# Patient Record
Sex: FEMALE | URBAN - METROPOLITAN AREA
[De-identification: names, ages, dates, MRNs, and addresses within clinical notes are randomized per-mention and may not be internally consistent; named-entity substitution may affect disease eponyms.]

---

## 2023-02-21 LAB
ABO, EXTERNAL RESULT: NORMAL
C. TRACHOMATIS, EXTERNAL RESULT: NEGATIVE
HEP B, EXTERNAL RESULT: NEGATIVE
HIV, EXTERNAL RESULT: NORMAL
N. GONORRHOEAE, EXTERNAL RESULT: NEGATIVE
RPR, EXTERNAL RESULT: NORMAL
RUBELLA TITER, EXTERNAL RESULT: NORMAL

## 2023-05-17 ENCOUNTER — HOSPITAL ENCOUNTER (OUTPATIENT)
Facility: HOSPITAL | Age: 28
Discharge: HOME | End: 2023-05-20
Payer: MEDICAID

## 2023-05-17 PROCEDURE — 76805 OB US >/= 14 WKS SNGL FETUS: CPT | Performed by: OBSTETRICS & GYNECOLOGY

## 2023-05-17 NOTE — PROCEDURES
Indication  ========    Fetal anatomic survey    Method  ======    Transabdominal ultrasound examination. View: Good view    Dating  ======    LMP on: 12/24/2022  GA by LMP 20 w + 4 d  AUSTYN by LMP: 9/30/2023  Ultrasound examination on: 5/17/2023  GA by U/S based upon: [facility], BPD, Femur, HC  GA by U/S 20 w + 1 d  AUSTYN by U/S: 10/3/2023  Assigned: based on the LMP, selected on 05/17/2023  Assigned GA 20 w + 4 d  Assigned AUSTYN: 9/30/2023    Fetal Biometry  ============    Standard  BPD 46.1 mm 20w 0d 24% Hadlock  OFD 61.8 mm 21w 1d 72% Anselmo  .9 mm 19w 6d 13% Hadlock  Cerebellum tr 21.5 mm 20w 2d 59% Hill  Nuchal fold 3.2 mm  .5 mm 20w 2d 34% Hadlock  Femur 33.1 mm 20w 2d 34% Hadlock  Humerus 31.1 mm 20w 2d 40% Anselmo   g 20w 1d 29% Hadlock  EFW (lb) 0 lb  EFW (oz) 12 oz  EFW by: Hadlock (BPD-HC-AC-FL)  Extended   6.0 mm  CM 4.3 mm  22% Nicolaides  Nasal bone 5.8 mm  Head / Face / Neck  Nasal bone: present  Other Structures   bpm    General Evaluation  ==============    Cardiac activity present.  bpm. Fetal movements: visualized. Presentation: breech  Placenta: Placental site: anterior  Umbilical cord: Cord vessels: 3 vessel cord. Insertion site: normal insertion  Amniotic fluid: Amount of AF: normal amount.  MVP 5.4 cm    Fetal Anatomy  ===========    Cranium: normal  Lateral ventricles: normal  Choroid plexus: normal  Midline falx: normal  Cavum septi pellucidi: normal  Cerebellum: normal  Cisterna magna: normal  Head / Neck  Neck: normal  Lips: normal  Profile: normal  Nose: normal  Face  Coronal face: normal  Nasal bone: present  Palate: normal  Maxilla: normal  Mandible: normal  Orbits: normal  4-chamber view: normal  RVOT view: normal  LVOT view: normal  3-vessel view: normal  3-vessel-trachea view: normal  Heart / Thorax  Situs: situs solitus (normal)  Aortic arch view: normal  Bicaval view: normal  Ductal arch view: normal  Interventricular septum: normal  Diaphragm: normal  Cord

## 2023-08-07 ENCOUNTER — HOSPITAL ENCOUNTER (OUTPATIENT)
Facility: HOSPITAL | Age: 28
Discharge: HOME | End: 2023-08-10
Payer: MEDICAID

## 2023-08-07 DIAGNOSIS — O24.419 GDM (GESTATIONAL DIABETES MELLITUS): ICD-10-CM

## 2023-08-07 PROCEDURE — 76816 OB US FOLLOW-UP PER FETUS: CPT | Performed by: OBSTETRICS & GYNECOLOGY

## 2023-08-07 PROCEDURE — 76818 FETAL BIOPHYS PROFILE W/NST: CPT | Performed by: OBSTETRICS & GYNECOLOGY

## 2023-08-07 RX ORDER — GLUCOSAMINE HCL/CHONDROITIN SU 500-400 MG
CAPSULE ORAL
Qty: 200 STRIP | Refills: 3 | OUTPATIENT
Start: 2023-08-07

## 2023-08-07 RX ORDER — LANCETS 30 GAUGE
EACH MISCELLANEOUS
Qty: 200 EACH | Refills: 3 | OUTPATIENT
Start: 2023-08-07

## 2023-08-07 RX ORDER — BLOOD-GLUCOSE METER
EACH MISCELLANEOUS
Qty: 1 KIT | Refills: 0 | OUTPATIENT
Start: 2023-08-07

## 2023-08-07 RX ORDER — PEN NEEDLE, DIABETIC 31 GX5/16"
NEEDLE, DISPOSABLE MISCELLANEOUS
Qty: 200 EACH | Refills: 3 | OUTPATIENT
Start: 2023-08-07

## 2023-08-08 NOTE — PROCEDURES
PATIENT: Wanda Torres   -  : 1995   -  DOS:2023   -  INTERPRETING PROVIDER:Hali Stephens,   Indication  ========    Gestational diabetes    Method  ======    Transabdominal ultrasound examination. View: Sufficient    Pregnancy  =========    Persaud pregnancy. Number of fetuses: 1    Dating  ======    LMP on: 2022  GA by LMP 28 w + 2 d  AUSTYN by LMP: 2023  Ultrasound examination on: 2023  GA by U/S based upon: [facility], BPD, Femur, HC  GA by U/S 35 w + 1 d  AUSTYN by U/S: 2023  Assigned: based on the LMP, selected on 2023  Assigned GA 32 w + 2 d  Assigned AUSTYN: 2023    Fetal Biometry  ============    Standard  BPD 82.2 mm 33w 0d 65% Hadlock  .9 mm 35w 2d 96% Anselmo  .9 mm 33w 5d 49% Hadlock  .7 mm 33w 1d 75% Hadlock  Femur 62.5 mm 32w 3d 39% Hadlock  EFW 2,091 g 32w 5d 61% Hadlock  EFW (lb) 4 lb  EFW (oz) 10 oz  EFW by: Hadlock (BPD-HC-AC-FL)  Extended   4.6 mm  Other Structures   bpm    General Evaluation  ==============    Cardiac activity present.  bpm. Fetal movements: visualized. Presentation: cephalic  Umbilical cord: Cord vessels: 3 vessel cord. Insertion site: normal insertion  Amniotic fluid: MVP 4.9 cm. LILIBETH 14.7 cm. Q1 4.9 cm, Q2 3.3 cm, Q3 3.4 cm, Q4 3.1 cm    Fetal Anatomy  ===========    Cranium: normal  Lateral ventricles: normal  Cavum septi pellucidi: normal  4-chamber view: normal  LVOT view: normal  Stomach: normal  Kidneys: normal  Bladder: normal  Fetal sex: XX  Wants to know fetal sex: no    Biophysical Profile  ==============    0: Fetal breathing movements  2: Gross body movements  2: Fetal tone  2: Amniotic fluid volume  NST: reactive  8/10 Biophysical profile score    Non Stress Test  =============    NST interpretation: reactive. Test duration 20 min. Baseline  bpm. Baseline variability: moderate. Accelerations: present. Decelerations: absent. Uterine activity:  absent.  Acoustic stimulation:

## 2023-08-10 ENCOUNTER — NURSE ONLY (OUTPATIENT)
Age: 28
End: 2023-08-10
Payer: MEDICAID

## 2023-08-10 DIAGNOSIS — O24.410 DIET CONTROLLED GESTATIONAL DIABETES MELLITUS (GDM) IN SECOND TRIMESTER: Primary | ICD-10-CM

## 2023-08-10 PROCEDURE — G0108 DIAB MANAGE TRN  PER INDIV: HCPCS

## 2023-08-14 NOTE — PROGRESS NOTES
IS DIABETES? Role of the normal pancreas in energy balance and blood glucose control   The defect seen in diabetes   Signs & symptoms of diabetes   Diagnosis of diabetes   Types of diabetes   Blood glucose targets in non-pregnant & non-pregnant adults       The participant knows  Their type of diabetes: Yes   The basic physiologic defect: Yes  Blood glucose targets: Yes     DATE [facility] TOPIC EVALUATION     8/14/2023 WHAT CAN I EAT? General principles   Determining a healthy weight   Nutritional terms & tools   Healthy Plate method   Carbohydrate Counting   Reading food labels   Free apps   Pregnancy recommendations      The participant   Uses Healthy Plate principles in constructing meals: Yes  Reads food labels in choosing acceptable foods: Yes         DATE [facility] TOPIC EVALUATION     8/14/2023 HOW CAN BLOOD GLUCOSE MONITORING HELP ME? Value of blood glucose monitoring   Realistic expectations   Blood glucose monitoring targets   Target adjustments   Setting a1c & blood glucose targets with provider   Meter selection    Technique for obtaining blood droplet   Blood glucose testing sites   Determining best times to test   Pregnancy recommendations   Data sharing with provider        The participant   Can demonstrate their glucometer procedure: Yes  Logs their BG readings:  Yes         [facility]     8/14/2023 HOW DOES PHYSICAL ACTIVITY AFFECT MY DIABETES? Benefits of physical activity   Beginning a program of physical activity   Walking   Pedometers   Goal setting   Structured physical activity program   Aerobic activity   Resistance   Flexibility   Balance   Physical activity program progression   Safety issues   Barriers to physical activity   Facilitators of physical activity        The participant has established a regular physical activity plan: No    The participant needs to address increasing physical activity.      Royal Otero RN on 8/14/2023 at 2:06 PM    I have personally reviewed the

## 2023-08-21 ENCOUNTER — HOSPITAL ENCOUNTER (OUTPATIENT)
Facility: HOSPITAL | Age: 28
Discharge: HOME | End: 2023-08-24

## 2023-08-21 NOTE — PROCEDURES
PATIENT: Briseida Duron   -  : 1995   -  DOS:2023   -  INTERPRETING PROVIDER:Nikkie Stephens,   Indication  ========    Gestational diabetes    Method  ======    Transabdominal ultrasound examination. View: Sufficient    Pregnancy  =========    Persaud pregnancy. Number of fetuses: 1    Dating  ======    LMP on: 2022  GA by LMP 29 w + 2 d  AUSTYN by LMP: 2023  Assigned: based on the LMP, selected on 2023  Assigned GA 34 w + 2 d  Assigned AUSTYN: 2023    General Evaluation  ==============    Cardiac activity present.  bpm. Fetal movements: visualized. Presentation: cephalic  Placenta: Placental site: anterior  Umbilical cord: Cord vessels: 3 vessel cord    Fetal Biometry  ============    Standard  EFW by: Hadlock (BPD-HC-AC-FL)  Extended   6.3 mm  Other Structures   bpm    Amniotic Fluid Assessment  =====================    Amount of AF: normal amount  MVP 5.4 cm. LILIBETH 14.2 cm. Q1 5.4 cm, Q2 1.9 cm, Q3 4.0 cm, Q4 2.8 cm    Biophysical Profile  ==============    2: Fetal breathing movements  2: Gross body movements  2: Fetal tone  2: Amniotic fluid volume   Biophysical profile score    Findings  =======    Biophysical Profile without NST (26405)    Please see biophysical profile score noted above. Fetal movement and fluid volume appear normal.    Plan of Care  ==========    I reviewed Mariana's blood sugar log. Her blood sugar log for the first week was excellent with all her fasting and postprandial values well controlled. During the second week,  her fasting blood sugars were low 100s. Her postprandial blood sugars were normal. She is eating bread before she goes to bed. We discussed bedtime snacks that are  high in protein to eat instead. Follow-up  ========    Follow up in 2 weeks for fetal growth and to assess blood sugar log.     Coding  ======    Code: O24.410  Description: Gestational diabetes mellitus in pregnancy, diet

## 2023-08-30 ENCOUNTER — TELEPHONE (OUTPATIENT)
Age: 28
End: 2023-08-30

## 2023-08-30 NOTE — TELEPHONE ENCOUNTER
Returned call to pt after speaking to Burton. Informed pt that [facility] staff stated that they would fix the issue with the insurance so that she would be able to  her glucose testing strips enabling her to check her blood sugars 4 times per day. Pt verbalized understanding and states no other questions or concerns at this time.

## 2023-08-30 NOTE — TELEPHONE ENCOUNTER
Telephone call to [facility] for glucose strips. Spoke with pharmacy about pt checking glucose 4 times per day and needing to fill glucose strips. Pharmacy states that they would fix issue with insurance and that pt would be able to get strips later today.

## 2023-08-30 NOTE — TELEPHONE ENCOUNTER
Returned call to patient using  #[id number]. Pt states she is testing her blood glucose 4 times per day and needs more testing strips and the the pharmacy will not give her more because her insurance will not cover them. Pt states she uses [facility] on [address] [phone]. I informed pt that I would call and inquire if order could be placed for generic or other brand that insurance would cove and call and update the patient.

## 2023-09-07 ENCOUNTER — ROUTINE PRENATAL (OUTPATIENT)
Age: 28
End: 2023-09-07

## 2023-09-07 VITALS
TEMPERATURE: 98.3 F | RESPIRATION RATE: 18 BRPM | HEIGHT: 59 IN | HEART RATE: 86 BPM | WEIGHT: 152.4 LBS | BODY MASS INDEX: 30.72 KG/M2 | SYSTOLIC BLOOD PRESSURE: 104 MMHG | DIASTOLIC BLOOD PRESSURE: 67 MMHG

## 2023-09-07 DIAGNOSIS — O24.410 DIET CONTROLLED GESTATIONAL DIABETES MELLITUS (GDM), ANTEPARTUM: ICD-10-CM

## 2023-09-07 DIAGNOSIS — O09.90 SUPERVISION OF HIGH RISK PREGNANCY, ANTEPARTUM: Primary | ICD-10-CM

## 2023-09-07 PROCEDURE — 0502F SUBSEQUENT PRENATAL CARE: CPT | Performed by: FAMILY MEDICINE

## 2023-09-07 RX ORDER — GLUCOSAMINE HCL/CHONDROITIN SU 500-400 MG
CAPSULE ORAL
Qty: 200 STRIP | Refills: 3 | Status: SHIPPED | OUTPATIENT
Start: 2023-09-07

## 2023-09-07 RX ORDER — LANCETS 30 GAUGE
EACH MISCELLANEOUS
Qty: 200 EACH | Refills: 3 | Status: SHIPPED | OUTPATIENT
Start: 2023-09-07

## 2023-09-07 SDOH — ECONOMIC STABILITY: INCOME INSECURITY: HOW HARD IS IT FOR YOU TO PAY FOR THE VERY BASICS LIKE FOOD, HOUSING, MEDICAL CARE, AND HEATING?: PATIENT DECLINED

## 2023-09-07 SDOH — ECONOMIC STABILITY: HOUSING INSECURITY
IN THE LAST 12 MONTHS, WAS THERE A TIME WHEN YOU DID NOT HAVE A STEADY PLACE TO SLEEP OR SLEPT IN A SHELTER (INCLUDING NOW)?: NO

## 2023-09-07 SDOH — ECONOMIC STABILITY: FOOD INSECURITY: WITHIN THE PAST 12 MONTHS, THE FOOD YOU BOUGHT JUST DIDN'T LAST AND YOU DIDN'T HAVE MONEY TO GET MORE.: PATIENT DECLINED

## 2023-09-07 SDOH — ECONOMIC STABILITY: FOOD INSECURITY: WITHIN THE PAST 12 MONTHS, YOU WORRIED THAT YOUR FOOD WOULD RUN OUT BEFORE YOU GOT MONEY TO BUY MORE.: SOMETIMES TRUE

## 2023-09-07 ASSESSMENT — PATIENT HEALTH QUESTIONNAIRE - PHQ9
SUM OF ALL RESPONSES TO PHQ QUESTIONS 1-9: 0
SUM OF ALL RESPONSES TO PHQ9 QUESTIONS 1 & 2: 0
2. FEELING DOWN, DEPRESSED OR HOPELESS: 0
SUM OF ALL RESPONSES TO PHQ QUESTIONS 1-9: 0
1. LITTLE INTEREST OR PLEASURE IN DOING THINGS: 0

## 2023-09-07 NOTE — PROGRESS NOTES
Chief Complaint   Patient presents with    Routine Prenatal Visit     Patient is 36 weeks and 5 days. LMP was 2022. AUSTYN:2023. G2, P1. She is not having vaginal bleeding or discharge. She is taking her prenatal vitamins. She is having fetal movement. She has contractions on and off. She brought glucose logs. No other concerns.       FB-104 (half above goal but last 4 days have been normal with changes to nighttime snacks)  2hr PP:  (only 4 above goal)    26yo  at 36w5d by LMP    IUP: Rh pos  GBS today   Transfer of Care CrossOver: records available in Care Everywhere  A1GDM: reviewed logs, continue QID checks  some elevated fastings but improved recently with dietary changes, f/up next week to review values, discussed possibility of needing insulin  has MFM growth f/up scheduled     Estimated Date of Delivery: 23

## 2023-09-10 LAB — SPECIMEN STATUS REPORT: NORMAL

## 2023-09-11 LAB — B-HEM STREP SPEC QL CULT: NEGATIVE

## 2023-09-13 ENCOUNTER — ROUTINE PRENATAL (OUTPATIENT)
Age: 28
End: 2023-09-13

## 2023-09-13 VITALS
SYSTOLIC BLOOD PRESSURE: 99 MMHG | WEIGHT: 152 LBS | HEART RATE: 89 BPM | RESPIRATION RATE: 18 BRPM | DIASTOLIC BLOOD PRESSURE: 63 MMHG | HEIGHT: 59 IN | TEMPERATURE: 98.2 F | BODY MASS INDEX: 30.64 KG/M2 | OXYGEN SATURATION: 97 %

## 2023-09-13 DIAGNOSIS — Z3A.37 37 WEEKS GESTATION OF PREGNANCY: Primary | ICD-10-CM

## 2023-09-13 PROCEDURE — 0502F SUBSEQUENT PRENATAL CARE: CPT | Performed by: STUDENT IN AN ORGANIZED HEALTH CARE EDUCATION/TRAINING PROGRAM

## 2023-09-14 ENCOUNTER — ROUTINE PRENATAL (OUTPATIENT)
Age: 28
End: 2023-09-14

## 2023-09-14 VITALS — SYSTOLIC BLOOD PRESSURE: 102 MMHG | DIASTOLIC BLOOD PRESSURE: 72 MMHG | OXYGEN SATURATION: 98 % | HEART RATE: 75 BPM

## 2023-09-14 DIAGNOSIS — O24.410 DIET CONTROLLED GESTATIONAL DIABETES MELLITUS (GDM), ANTEPARTUM: Primary | ICD-10-CM

## 2023-09-14 NOTE — PROCEDURES
PATIENT: Jesus Jackson   -  : 1995   -  DOS:2023   -  INTERPRETING PROVIDER:Sanju Farley,   Indication  ========    Gestational diabetes    Method  ======    Transabdominal ultrasound examination. View: Sufficient    Pregnancy  =========    Persaud pregnancy. Number of fetuses: 1    Dating  ======    LMP on: 2022  GA by LMP 40 w + 5 d  AUSTYN by LMP: 2023  Ultrasound examination on: 2023  GA by U/S based upon: [facility], BPD, Femur, HC  GA by U/S 39 w + 4 d  AUSTYN by U/S: 10/8/2023  Assigned: based on the LMP, selected on 2023  Assigned GA 37 w + 5 d  Assigned AUSTYN: 2023    Fetal Biometry  ============    Standard  BPD 89.7 mm 36w 2d 32% Hadlock  .8 mm 38w 3d 60% Anselmo  .0 mm 36w 4d 8% Hadlock  .5 mm 37w 5d 68% Hadlock  Femur 69.5 mm 35w 5d 9% Hadlock  EFW 3,076 g 37w 2d 41% Hadlock  EFW (lb) 6 lb  EFW (oz) 13 oz  EFW by: Hadlock (BPD-HC-AC-FL)  Extended   5.3 mm  Other Structures   bpm    General Evaluation  ==============    Cardiac activity present.  bpm. Fetal movements: visualized. Presentation: cephalic  Placenta: Placental site: anterior  Umbilical cord: Cord vessels: 3 vessel cord  Amniotic fluid: Amount of AF: normal amount. MVP 4.5 cm. LILIBETH 15.5 cm. Q1 3.9 cm, Q2 4.4 cm, Q3 4.5 cm, Q4 2.7 cm    Fetal Anatomy  ===========    Cranium: normal  Lateral ventricles: normal  Cavum septi pellucidi: normal  4-chamber view: normal  LVOT view: normal  3-vessel view: normal  Stomach: normal  Kidneys: normal  Bladder: normal  Fetal sex: XX  Wants to know fetal sex: no    Biophysical Profile  ==============    2: Fetal breathing movements  2: Gross body movements  2: Fetal tone  2: Amniotic fluid volume   Biophysical profile score    Findings  =======    Biophysical Profile without NST (63651)    Please see biophysical profile score noted above.  Fetal movement and fluid volume appear normal.    Plan of Care  ==========    I reviewed

## 2023-09-20 ENCOUNTER — ROUTINE PRENATAL (OUTPATIENT)
Age: 28
End: 2023-09-20
Payer: MEDICAID

## 2023-09-20 VITALS
OXYGEN SATURATION: 99 % | HEIGHT: 59 IN | BODY MASS INDEX: 30.64 KG/M2 | RESPIRATION RATE: 18 BRPM | DIASTOLIC BLOOD PRESSURE: 62 MMHG | TEMPERATURE: 97.7 F | HEART RATE: 75 BPM | SYSTOLIC BLOOD PRESSURE: 98 MMHG | WEIGHT: 152 LBS

## 2023-09-20 DIAGNOSIS — Z3A.38 38 WEEKS GESTATION OF PREGNANCY: Primary | ICD-10-CM

## 2023-09-20 DIAGNOSIS — O24.410 DIET CONTROLLED GESTATIONAL DIABETES MELLITUS (GDM), ANTEPARTUM: ICD-10-CM

## 2023-09-20 PROCEDURE — 59025 FETAL NON-STRESS TEST: CPT

## 2023-09-20 RX ORDER — GLUCOSAMINE HCL/CHONDROITIN SU 500-400 MG
CAPSULE ORAL
Qty: 200 STRIP | Refills: 3 | Status: SHIPPED | OUTPATIENT
Start: 2023-09-20

## 2023-09-20 RX ORDER — LANCETS 30 GAUGE
EACH MISCELLANEOUS
Qty: 200 EACH | Refills: 3 | Status: SHIPPED | OUTPATIENT
Start: 2023-09-20

## 2023-09-20 RX ORDER — PEN NEEDLE, DIABETIC 31 GX5/16"
NEEDLE, DISPOSABLE MISCELLANEOUS
Qty: 200 EACH | Refills: 3 | Status: SHIPPED | OUTPATIENT
Start: 2023-09-20

## 2023-09-25 ENCOUNTER — HOSPITAL ENCOUNTER (INPATIENT)
Facility: HOSPITAL | Age: 28
LOS: 3 days | Discharge: HOME | DRG: 560 | End: 2023-09-28
Attending: FAMILY MEDICINE | Admitting: FAMILY MEDICINE
Payer: MEDICAID

## 2023-09-25 PROBLEM — Z34.90 ENCOUNTER FOR PLANNED INDUCTION OF LABOR: Status: ACTIVE | Noted: 2023-09-25

## 2023-09-25 LAB
ALBUMIN SERPL-MCNC: 2.8 G/DL (ref 3.5–5)
ALBUMIN/GLOB SERPL: 0.6 (ref 1.1–2.2)
ALP SERPL-CCNC: 150 U/L (ref 45–117)
ALT SERPL-CCNC: 24 U/L (ref 12–78)
ANION GAP SERPL CALC-SCNC: 9 MMOL/L (ref 5–15)
AST SERPL-CCNC: 16 U/L (ref 15–37)
BASOPHILS # BLD: 0 K/UL (ref 0–0.1)
BASOPHILS NFR BLD: 0 % (ref 0–1)
BILIRUB SERPL-MCNC: 0.2 MG/DL (ref 0.2–1)
BUN SERPL-MCNC: 6 MG/DL (ref 6–20)
BUN/CREAT SERPL: 10 (ref 12–20)
CALCIUM SERPL-MCNC: 8.7 MG/DL (ref 8.5–10.1)
CHLORIDE SERPL-SCNC: 106 MMOL/L (ref 97–108)
CO2 SERPL-SCNC: 21 MMOL/L (ref 21–32)
CREAT SERPL-MCNC: 0.58 MG/DL (ref 0.55–1.02)
DIFFERENTIAL METHOD BLD: ABNORMAL
EOSINOPHIL # BLD: 0 K/UL (ref 0–0.4)
EOSINOPHIL NFR BLD: 0 % (ref 0–7)
ERYTHROCYTE [DISTWIDTH] IN BLOOD BY AUTOMATED COUNT: 14 % (ref 11.5–14.5)
GLOBULIN SER CALC-MCNC: 4.4 G/DL (ref 2–4)
GLUCOSE BLD STRIP.AUTO-MCNC: 94 MG/DL (ref 65–117)
GLUCOSE SERPL-MCNC: 80 MG/DL (ref 65–100)
HCT VFR BLD AUTO: 37.5 % (ref 35–47)
HGB BLD-MCNC: 12.3 G/DL (ref 11.5–16)
IMM GRANULOCYTES # BLD AUTO: 0.1 K/UL (ref 0–0.04)
IMM GRANULOCYTES NFR BLD AUTO: 1 % (ref 0–0.5)
LYMPHOCYTES # BLD: 3.2 K/UL (ref 0.8–3.5)
LYMPHOCYTES NFR BLD: 26 % (ref 12–49)
MCH RBC QN AUTO: 26.7 PG (ref 26–34)
MCHC RBC AUTO-ENTMCNC: 32.8 G/DL (ref 30–36.5)
MCV RBC AUTO: 81.5 FL (ref 80–99)
MONOCYTES # BLD: 0.8 K/UL (ref 0–1)
MONOCYTES NFR BLD: 6 % (ref 5–13)
NEUTS SEG # BLD: 8.2 K/UL (ref 1.8–8)
NEUTS SEG NFR BLD: 67 % (ref 32–75)
NRBC # BLD: 0 K/UL (ref 0–0.01)
NRBC BLD-RTO: 0 PER 100 WBC
PLATELET # BLD AUTO: 371 K/UL (ref 150–400)
PMV BLD AUTO: 9.9 FL (ref 8.9–12.9)
POTASSIUM SERPL-SCNC: 3.8 MMOL/L (ref 3.5–5.1)
PROT SERPL-MCNC: 7.2 G/DL (ref 6.4–8.2)
RBC # BLD AUTO: 4.6 M/UL (ref 3.8–5.2)
SERVICE CMNT-IMP: NORMAL
SODIUM SERPL-SCNC: 136 MMOL/L (ref 136–145)
WBC # BLD AUTO: 12.3 K/UL (ref 3.6–11)

## 2023-09-25 PROCEDURE — 6370000000 HC RX 637 (ALT 250 FOR IP)

## 2023-09-25 PROCEDURE — 85025 COMPLETE CBC W/AUTO DIFF WBC: CPT

## 2023-09-25 PROCEDURE — 86900 BLOOD TYPING SEROLOGIC ABO: CPT

## 2023-09-25 PROCEDURE — 36415 COLL VENOUS BLD VENIPUNCTURE: CPT

## 2023-09-25 PROCEDURE — 1100000000 HC RM PRIVATE

## 2023-09-25 PROCEDURE — 82962 GLUCOSE BLOOD TEST: CPT

## 2023-09-25 PROCEDURE — 59200 INSERT CERVICAL DILATOR: CPT | Performed by: FAMILY MEDICINE

## 2023-09-25 PROCEDURE — 80053 COMPREHEN METABOLIC PANEL: CPT

## 2023-09-25 PROCEDURE — 7210000100 HC LABOR FEE PER 1 HR: Performed by: FAMILY MEDICINE

## 2023-09-25 PROCEDURE — 2580000003 HC RX 258

## 2023-09-25 PROCEDURE — 86850 RBC ANTIBODY SCREEN: CPT

## 2023-09-25 PROCEDURE — 86901 BLOOD TYPING SEROLOGIC RH(D): CPT

## 2023-09-25 RX ORDER — SODIUM CHLORIDE 9 MG/ML
INJECTION, SOLUTION INTRAVENOUS PRN
Status: CANCELLED | OUTPATIENT
Start: 2023-09-25

## 2023-09-25 RX ORDER — SODIUM CHLORIDE, SODIUM LACTATE, POTASSIUM CHLORIDE, CALCIUM CHLORIDE 600; 310; 30; 20 MG/100ML; MG/100ML; MG/100ML; MG/100ML
INJECTION, SOLUTION INTRAVENOUS CONTINUOUS
Status: DISCONTINUED | OUTPATIENT
Start: 2023-09-25 | End: 2023-09-28 | Stop reason: HOSPADM

## 2023-09-25 RX ORDER — IBUPROFEN 800 MG/1
800 TABLET ORAL EVERY 8 HOURS SCHEDULED
Status: DISCONTINUED | OUTPATIENT
Start: 2023-09-25 | End: 2023-09-25

## 2023-09-25 RX ORDER — SODIUM CHLORIDE 0.9 % (FLUSH) 0.9 %
5-40 SYRINGE (ML) INJECTION EVERY 12 HOURS SCHEDULED
Status: DISCONTINUED | OUTPATIENT
Start: 2023-09-25 | End: 2023-09-28 | Stop reason: HOSPADM

## 2023-09-25 RX ORDER — SODIUM CHLORIDE, SODIUM LACTATE, POTASSIUM CHLORIDE, AND CALCIUM CHLORIDE .6; .31; .03; .02 G/100ML; G/100ML; G/100ML; G/100ML
500 INJECTION, SOLUTION INTRAVENOUS PRN
Status: DISCONTINUED | OUTPATIENT
Start: 2023-09-25 | End: 2023-09-28 | Stop reason: HOSPADM

## 2023-09-25 RX ORDER — SODIUM CHLORIDE 0.9 % (FLUSH) 0.9 %
5-40 SYRINGE (ML) INJECTION PRN
Status: DISCONTINUED | OUTPATIENT
Start: 2023-09-25 | End: 2023-09-26 | Stop reason: SDUPTHER

## 2023-09-25 RX ORDER — SODIUM CHLORIDE, SODIUM LACTATE, POTASSIUM CHLORIDE, AND CALCIUM CHLORIDE .6; .31; .03; .02 G/100ML; G/100ML; G/100ML; G/100ML
1000 INJECTION, SOLUTION INTRAVENOUS PRN
Status: DISCONTINUED | OUTPATIENT
Start: 2023-09-25 | End: 2023-09-28 | Stop reason: HOSPADM

## 2023-09-25 RX ORDER — SODIUM CHLORIDE 0.9 % (FLUSH) 0.9 %
5-40 SYRINGE (ML) INJECTION EVERY 12 HOURS SCHEDULED
Status: CANCELLED | OUTPATIENT
Start: 2023-09-25

## 2023-09-25 RX ORDER — SODIUM CHLORIDE 9 MG/ML
25 INJECTION, SOLUTION INTRAVENOUS PRN
Status: DISCONTINUED | OUTPATIENT
Start: 2023-09-25 | End: 2023-09-28 | Stop reason: HOSPADM

## 2023-09-25 RX ORDER — DOCUSATE SODIUM 100 MG/1
100 CAPSULE, LIQUID FILLED ORAL 2 TIMES DAILY
Status: DISCONTINUED | OUTPATIENT
Start: 2023-09-25 | End: 2023-09-26 | Stop reason: HOSPADM

## 2023-09-25 RX ORDER — DOCUSATE SODIUM 100 MG/1
100 CAPSULE, LIQUID FILLED ORAL 2 TIMES DAILY
Status: CANCELLED | OUTPATIENT
Start: 2023-09-25

## 2023-09-25 RX ORDER — SODIUM CHLORIDE 0.9 % (FLUSH) 0.9 %
5-40 SYRINGE (ML) INJECTION PRN
Status: CANCELLED | OUTPATIENT
Start: 2023-09-25

## 2023-09-25 RX ORDER — ONDANSETRON 2 MG/ML
4 INJECTION INTRAMUSCULAR; INTRAVENOUS EVERY 6 HOURS PRN
Status: DISCONTINUED | OUTPATIENT
Start: 2023-09-25 | End: 2023-09-28 | Stop reason: HOSPADM

## 2023-09-25 RX ORDER — SODIUM CHLORIDE, SODIUM LACTATE, POTASSIUM CHLORIDE, CALCIUM CHLORIDE 600; 310; 30; 20 MG/100ML; MG/100ML; MG/100ML; MG/100ML
INJECTION, SOLUTION INTRAVENOUS CONTINUOUS
Status: CANCELLED | OUTPATIENT
Start: 2023-09-25

## 2023-09-25 RX ORDER — LANOLIN/MINERAL OIL
LOTION (ML) TOPICAL PRN
Status: CANCELLED | OUTPATIENT
Start: 2023-09-25

## 2023-09-25 RX ADMIN — Medication 25 MCG: at 20:10

## 2023-09-25 RX ADMIN — Medication 25 MCG: at 22:08

## 2023-09-25 NOTE — H&P
[facility], [address] [place] [address]   Office [phone], Fax [phone]      History & Physical    Name: Julio César Bridges MRN: [id number]  SSN: [id number]    YOB: 1995  Age: 33 y.o. Sex: female      Subjective:     Reason for Admission:  Pregnancy and A1GDM    History of Present Illness: Ms. Clara Izaguirre is a 33 y.o.  female with an estimated gestational age of 45w4d with Estimated Date of Delivery: 23. Patient has no complaints . Here for IOL for A1GDM. Pregnancy has been complicated by V6TSP. Patient denies headaches, blurring of vision, bleeding PV, LOF, contractions, foul smelling discharge. OB History    Para Term  AB Living   2 1 1     1   SAB IAB Ectopic Molar Multiple Live Births             1      # Outcome Date GA Lbr Cortez/2nd Weight Sex Delivery Anes PTL Lv   2 Current            1 Term    8 lb (3.629 kg) M Vag-Spont   FRED     Past Medical History:   Diagnosis Date    Diabetes mellitus ([address])      No past surgical history on file. Social History     Occupational History    Not on file   Tobacco Use    Smoking status: Never    Smokeless tobacco: Never   Vaping Use    Vaping Use: Never used   Substance and Sexual Activity    Alcohol use: Not Currently    Drug use: Never    Sexual activity: Not on file      No family history on file. No Known Allergies  Prior to Admission medications    Medication Sig Start Date End Date Taking? Authorizing Provider   Alcohol Swabs (ALCOHOL PREP) PADS Please use to four times a day to clean finger before checking blood sugar. Substitute for insurance preferred brand 23   Sven Morillo MD   blood glucose monitor strips Use to check blood glucose 4 times a day. Please substitute for insurance preferred brand 23   vSen Morillo MD   Lancets MISC Use to check blood glucose 4 times a day.  Please substitute for insurance preferred brand 23   Sven Morillo MD   Prenatal Vit-Fe

## 2023-09-25 NOTE — PROGRESS NOTES
1900: SBAR report given to this RN per Maria Guadalupe Pacheco.     6616: SBAR report given BRENDAN Hogue RN per this RN.

## 2023-09-26 ENCOUNTER — ANESTHESIA EVENT (OUTPATIENT)
Facility: HOSPITAL | Age: 28
DRG: 560 | End: 2023-09-26
Payer: MEDICAID

## 2023-09-26 ENCOUNTER — ANESTHESIA (OUTPATIENT)
Facility: HOSPITAL | Age: 28
DRG: 560 | End: 2023-09-26
Payer: MEDICAID

## 2023-09-26 LAB
ABO + RH BLD: NORMAL
BLOOD GROUP ANTIBODIES SERPL: NORMAL
GLUCOSE BLD STRIP.AUTO-MCNC: 107 MG/DL (ref 65–117)
GLUCOSE BLD STRIP.AUTO-MCNC: 85 MG/DL (ref 65–117)
SERVICE CMNT-IMP: NORMAL
SERVICE CMNT-IMP: NORMAL
SPECIMEN EXP DATE BLD: NORMAL

## 2023-09-26 PROCEDURE — 82962 GLUCOSE BLOOD TEST: CPT

## 2023-09-26 PROCEDURE — 3700000025 EPIDURAL BLOCK: Performed by: ANESTHESIOLOGY

## 2023-09-26 PROCEDURE — 7220000101 HC DELIVERY VAGINAL/SINGLE: Performed by: FAMILY MEDICINE

## 2023-09-26 PROCEDURE — 4A1HXCZ MONITORING OF PRODUCTS OF CONCEPTION, CARDIAC RATE, EXTERNAL APPROACH: ICD-10-PCS | Performed by: FAMILY MEDICINE

## 2023-09-26 PROCEDURE — 59400 OBSTETRICAL CARE: CPT | Performed by: FAMILY MEDICINE

## 2023-09-26 PROCEDURE — 1120000000 HC RM PRIVATE OB

## 2023-09-26 PROCEDURE — 2580000003 HC RX 258

## 2023-09-26 PROCEDURE — 51701 INSERT BLADDER CATHETER: CPT

## 2023-09-26 PROCEDURE — 7210000100 HC LABOR FEE PER 1 HR: Performed by: FAMILY MEDICINE

## 2023-09-26 PROCEDURE — 94761 N-INVAS EAR/PLS OXIMETRY MLT: CPT

## 2023-09-26 PROCEDURE — 76815 OB US LIMITED FETUS(S): CPT | Performed by: FAMILY MEDICINE

## 2023-09-26 PROCEDURE — 6370000000 HC RX 637 (ALT 250 FOR IP): Performed by: FAMILY MEDICINE

## 2023-09-26 PROCEDURE — 36415 COLL VENOUS BLD VENIPUNCTURE: CPT

## 2023-09-26 PROCEDURE — 3E0DXGC INTRODUCTION OF OTHER THERAPEUTIC SUBSTANCE INTO MOUTH AND PHARYNX, EXTERNAL APPROACH: ICD-10-PCS | Performed by: FAMILY MEDICINE

## 2023-09-26 PROCEDURE — 00HU33Z INSERTION OF INFUSION DEVICE INTO SPINAL CANAL, PERCUTANEOUS APPROACH: ICD-10-PCS | Performed by: STUDENT IN AN ORGANIZED HEALTH CARE EDUCATION/TRAINING PROGRAM

## 2023-09-26 PROCEDURE — 2500000003 HC RX 250 WO HCPCS: Performed by: NURSE ANESTHETIST, CERTIFIED REGISTERED

## 2023-09-26 PROCEDURE — 3700000156 HC EPIDURAL ANESTHESIA: Performed by: NURSE ANESTHETIST, CERTIFIED REGISTERED

## 2023-09-26 PROCEDURE — 6370000000 HC RX 637 (ALT 250 FOR IP)

## 2023-09-26 PROCEDURE — 6360000002 HC RX W HCPCS: Performed by: NURSE ANESTHETIST, CERTIFIED REGISTERED

## 2023-09-26 RX ORDER — SODIUM CHLORIDE, SODIUM LACTATE, POTASSIUM CHLORIDE, CALCIUM CHLORIDE 600; 310; 30; 20 MG/100ML; MG/100ML; MG/100ML; MG/100ML
INJECTION, SOLUTION INTRAVENOUS CONTINUOUS
Status: DISCONTINUED | OUTPATIENT
Start: 2023-09-26 | End: 2023-09-26 | Stop reason: SDUPTHER

## 2023-09-26 RX ORDER — SODIUM CHLORIDE 0.9 % (FLUSH) 0.9 %
5-40 SYRINGE (ML) INJECTION EVERY 12 HOURS SCHEDULED
Status: DISCONTINUED | OUTPATIENT
Start: 2023-09-26 | End: 2023-09-28 | Stop reason: HOSPADM

## 2023-09-26 RX ORDER — SODIUM CHLORIDE 9 MG/ML
INJECTION, SOLUTION INTRAVENOUS PRN
Status: DISCONTINUED | OUTPATIENT
Start: 2023-09-26 | End: 2023-09-28 | Stop reason: HOSPADM

## 2023-09-26 RX ORDER — DOCUSATE SODIUM 100 MG/1
100 CAPSULE, LIQUID FILLED ORAL 2 TIMES DAILY
Status: DISCONTINUED | OUTPATIENT
Start: 2023-09-26 | End: 2023-09-28 | Stop reason: HOSPADM

## 2023-09-26 RX ORDER — LANOLIN/MINERAL OIL
LOTION (ML) TOPICAL PRN
Status: DISCONTINUED | OUTPATIENT
Start: 2023-09-26 | End: 2023-09-28 | Stop reason: HOSPADM

## 2023-09-26 RX ORDER — SODIUM CHLORIDE, SODIUM LACTATE, POTASSIUM CHLORIDE, CALCIUM CHLORIDE 600; 310; 30; 20 MG/100ML; MG/100ML; MG/100ML; MG/100ML
INJECTION, SOLUTION INTRAVENOUS CONTINUOUS
Status: DISCONTINUED | OUTPATIENT
Start: 2023-09-26 | End: 2023-09-28 | Stop reason: HOSPADM

## 2023-09-26 RX ORDER — IBUPROFEN 600 MG/1
600 TABLET ORAL EVERY 8 HOURS SCHEDULED
Status: DISCONTINUED | OUTPATIENT
Start: 2023-09-26 | End: 2023-09-28 | Stop reason: HOSPADM

## 2023-09-26 RX ORDER — ACETAMINOPHEN 325 MG/1
650 TABLET ORAL EVERY 6 HOURS PRN
Status: DISCONTINUED | OUTPATIENT
Start: 2023-09-26 | End: 2023-09-28 | Stop reason: HOSPADM

## 2023-09-26 RX ORDER — BUPIVACAINE HYDROCHLORIDE 2.5 MG/ML
INJECTION, SOLUTION EPIDURAL; INFILTRATION; INTRACAUDAL PRN
Status: DISCONTINUED | OUTPATIENT
Start: 2023-09-26 | End: 2023-09-26 | Stop reason: SDUPTHER

## 2023-09-26 RX ORDER — DOCUSATE SODIUM 100 MG/1
100 CAPSULE, LIQUID FILLED ORAL 2 TIMES DAILY
Status: DISCONTINUED | OUTPATIENT
Start: 2023-09-26 | End: 2023-09-26 | Stop reason: SDUPTHER

## 2023-09-26 RX ORDER — SODIUM CHLORIDE 0.9 % (FLUSH) 0.9 %
5-40 SYRINGE (ML) INJECTION PRN
Status: DISCONTINUED | OUTPATIENT
Start: 2023-09-26 | End: 2023-09-28 | Stop reason: HOSPADM

## 2023-09-26 RX ORDER — NALOXONE HYDROCHLORIDE 0.4 MG/ML
INJECTION, SOLUTION INTRAMUSCULAR; INTRAVENOUS; SUBCUTANEOUS PRN
Status: DISCONTINUED | OUTPATIENT
Start: 2023-09-26 | End: 2023-09-28 | Stop reason: HOSPADM

## 2023-09-26 RX ORDER — LIDOCAINE HYDROCHLORIDE AND EPINEPHRINE 15; 5 MG/ML; UG/ML
INJECTION, SOLUTION EPIDURAL PRN
Status: DISCONTINUED | OUTPATIENT
Start: 2023-09-26 | End: 2023-09-26 | Stop reason: SDUPTHER

## 2023-09-26 RX ORDER — LANOLIN/MINERAL OIL
LOTION (ML) TOPICAL PRN
Status: DISCONTINUED | OUTPATIENT
Start: 2023-09-26 | End: 2023-09-26 | Stop reason: SDUPTHER

## 2023-09-26 RX ORDER — POLYETHYLENE GLYCOL 3350 17 G/17G
17 POWDER, FOR SOLUTION ORAL DAILY
Status: DISCONTINUED | OUTPATIENT
Start: 2023-09-26 | End: 2023-09-28 | Stop reason: HOSPADM

## 2023-09-26 RX ORDER — SODIUM CHLORIDE 0.9 % (FLUSH) 0.9 %
5-40 SYRINGE (ML) INJECTION PRN
Status: DISCONTINUED | OUTPATIENT
Start: 2023-09-26 | End: 2023-09-26 | Stop reason: SDUPTHER

## 2023-09-26 RX ORDER — FENTANYL 0.2 MG/100ML-BUPIV 0.125%-NACL 0.9% EPIDURAL INJ 2/0.125 MCG/ML-%
10 SOLUTION INJECTION CONTINUOUS
Status: DISCONTINUED | OUTPATIENT
Start: 2023-09-26 | End: 2023-09-28 | Stop reason: HOSPADM

## 2023-09-26 RX ORDER — EPHEDRINE SULFATE/0.9% NACL/PF 50 MG/5 ML
10 SYRINGE (ML) INTRAVENOUS EVERY 5 MIN PRN
Status: DISPENSED | OUTPATIENT
Start: 2023-09-26 | End: 2023-09-28

## 2023-09-26 RX ADMIN — SODIUM CHLORIDE, POTASSIUM CHLORIDE, SODIUM LACTATE AND CALCIUM CHLORIDE 1000 ML: 600; 310; 30; 20 INJECTION, SOLUTION INTRAVENOUS at 08:32

## 2023-09-26 RX ADMIN — Medication 25 MCG: at 02:58

## 2023-09-26 RX ADMIN — IBUPROFEN 600 MG: 600 TABLET ORAL at 23:05

## 2023-09-26 RX ADMIN — Medication 10 MG: at 10:00

## 2023-09-26 RX ADMIN — LIDOCAINE HYDROCHLORIDE AND EPINEPHRINE 3 ML: 15; 5 INJECTION, SOLUTION EPIDURAL at 09:28

## 2023-09-26 RX ADMIN — IBUPROFEN 600 MG: 600 TABLET ORAL at 15:40

## 2023-09-26 RX ADMIN — Medication 10 ML/HR: at 09:46

## 2023-09-26 RX ADMIN — SODIUM CHLORIDE, POTASSIUM CHLORIDE, SODIUM LACTATE AND CALCIUM CHLORIDE: 600; 310; 30; 20 INJECTION, SOLUTION INTRAVENOUS at 10:00

## 2023-09-26 RX ADMIN — Medication 25 MCG: at 00:44

## 2023-09-26 RX ADMIN — BUPIVACAINE HYDROCHLORIDE 5 ML: 2.5 INJECTION, SOLUTION EPIDURAL; INFILTRATION; INTRACAUDAL; PERINEURAL at 09:30

## 2023-09-26 RX ADMIN — LIDOCAINE HYDROCHLORIDE AND EPINEPHRINE 2 ML: 15; 5 INJECTION, SOLUTION EPIDURAL at 09:30

## 2023-09-26 RX ADMIN — Medication 25 MCG: at 05:17

## 2023-09-26 ASSESSMENT — PAIN SCALES - GENERAL: PAINLEVEL_OUTOF10: 5

## 2023-09-26 ASSESSMENT — PAIN DESCRIPTION - DESCRIPTORS: DESCRIPTORS: SORE;ACHING;CRAMPING

## 2023-09-26 ASSESSMENT — PAIN DESCRIPTION - LOCATION: LOCATION: ABDOMEN;PERINEUM

## 2023-09-26 ASSESSMENT — PAIN DESCRIPTION - ORIENTATION: ORIENTATION: LOWER

## 2023-09-26 NOTE — PROGRESS NOTES
Labor Progress Note  Patient seen, fetal heart rate and contraction pattern evaluated, patient examined. Patient Vitals for the past 4 hrs:   Pulse BP SpO2   09/26/23 1023 -- -- 100 %   09/26/23 1018 -- -- 100 %   09/26/23 1013 -- -- 100 %   09/26/23 1010 85 (!) 111/54 --   09/26/23 1008 91 -- 100 %   09/26/23 1007 78 119/60 --   09/26/23 1003 70 117/62 100 %   09/26/23 1001 75 (!) 101/59 --   09/26/23 0958 -- (!) 99/51 --   09/26/23 0955 -- (!) 84/49 --   09/26/23 0952 -- (!) 101/55 --   09/26/23 0949 -- (!) 92/53 --   09/26/23 0946 -- (!) 81/47 --   09/26/23 0943 -- (!) 90/50 --   09/26/23 0940 -- (!) 105/53 --   09/26/23 0937 -- (!) 100/55 --   09/26/23 0932 -- 120/67 --            Physical Exam:  Cervical Exam:     Membranes:  Intact  Uterine Activity: Frequency: Every 4-5 minutes  Fetal Heart Rate: Baseline: 140 per minute  Decelerations: late, noted, positional changes done  Uterine contractions: regular, every 3-4 minutes  SVE @ 10/100/0      Assessment/Plan     Kenny Vo is a 33 y.o. female currently in active labor    1. IOL- GBS neg. S/p cytotec and cook cath. - Membranes ruptured  - Continue FHR monitoring and toco  - Patient will push now       A1GDM: diet-controlled. Failed 3-hr GTT. Antepartum glucose within range.   - Glucose q2h    Pt discussed with Dr. Mayank Alfonso ( attending)     Cory Russell MD  [facility] Practice Resident

## 2023-09-26 NOTE — ANESTHESIA POSTPROCEDURE EVALUATION
Department of Anesthesiology  Postprocedure Note    Patient: Tonia Bahena  MRN: [id number]  YOB: 1995  Date of evaluation: 9/26/2023      Procedure Summary     Date: 09/26/23 Room / Location:     Anesthesia Start: 0910 Anesthesia Stop: 5983    Procedure: Labor Analgesia Diagnosis:     Scheduled Providers:  Responsible Provider: Arianna Kaye MD    Anesthesia Type: epidural ASA Status: 2          Anesthesia Type: No value filed.     Alondra Phase I:      Alondra Phase II:        Anesthesia Post Evaluation    Patient location during evaluation: PACU  Patient participation: complete - patient participated  Level of consciousness: awake  Pain score: 0  Airway patency: patent  Nausea & Vomiting: no nausea and no vomiting  Complications: no  Cardiovascular status: blood pressure returned to baseline  Respiratory status: acceptable  Hydration status: euvolemic  Multimodal analgesia pain management approach  Pain management: adequate

## 2023-09-26 NOTE — PROGRESS NOTES
0700 Bedside and Verbal shift change report given to Sandra Morales RN (oncoming nurse) by Salina Hidalgo RN (offgoing nurse). Report included the following information Nurse Handoff Report, Index, Adult Overview, Surgery Report, Intake/Output, MAR, and Recent Results. 0715 verbal orders received from Resident Joceline Portillo to hold miso until Mary Patel MD arrives to unit    0813 RN, Residents, and Mary Patel MD to bedside to assess pt. SVE by MD: 6/70/-1. No new orders received at this time, bolus for epidural started at this time    1001 Mary Patel MD, Residents, and TOBIAS Mcfarlane CRNA at bedside d/t decreased BP and fetal tracing. Pt asymptomatic at this time. See MAR for medication details    2520 [address] [place] [address] MD, residents, and this RN at bedside to assess pt. SVE by MD: 10/100/0. Orders received to start pushing at this time    1131 Patient actively pushing. RN remains in continuous attendance at the bedside. Assessment & evaluation of fetal heart rate ongoing via continuous EFM. 2100 [facility] remained at bedside throughout pushing. EFM continuously assessed. Vaginal delivery of viable infant.      1600 pt transferred to MIU via this RN to Lois Rowell

## 2023-09-26 NOTE — L&D DELIVERY NOTE
Patient progressed well to 10/100/+2 and pushed for approximately 5 min w/  over a 1st degree perineal and periurethral laceration. of a liveborn  infant,  Head delivered slightly NABIL. Anterior shoulder delivered w/ single maternal effort w/ posterior shoulder and body following easily. Infant placed on maternal abdomen. Delayed cord clamping x 1 min. Cord clamped x 2 and cut by  FOB). Pitocin infusion initiated. Placenta delivered spontaneously intact w/ 3 v cord. Perineum inspected. Fundus firm at U. Mother and baby bonding in LDR. Sebasana Elmore, Female [facility] [[id number]]      Labor Events     Labor: No   Steroids: None  Cervical Ripening Date/Time:  23 18:04:00   Cervical Ripening Type: Colon/EASI, Misoprostol  Antibiotics Received during Labor: No  Rupture Date/Time:      Rupture Type: Intact  Induction: Cervical Ripening Balloon, Misoprostol  Augmentation: None              Delivery Details      Delivery Date: 23 Delivery Time: 11:55:00                 Cord    Vessels: 3 Vessels  Cord Blood Disposition: Lab              Placenta           Lacerations           Blood Loss  Mother: Daiman Chambers #[id number]     Start of Mother's Information      Delivery Blood Loss  23 2355 - 23 1221      None                 End of Mother's Information  Mother: Damian Chambers #[id number]                Delivery Providers    Delivering clinician:      Provider Role     Obstetrician     Primary Nurse     Primary  Nurse     NICU Nurse     Neonatologist     Anesthesiologist     Nurse Anesthetist     Nurse Practitioner     Midwife     Nursery Nurse    June Singleton MD Resident    Stewart Apodaca MD Resident              Blessing Assessment          Skin Color:   Heart Rate:   Reflex Irritability:   Muscle Tone:   Respiratory Effort:    Total:            1 Minute:         5 Minute:                                                 Blessing Measurements

## 2023-09-26 NOTE — CARE COORDINATION
9/26/2023  3:43 PM    CM met with YOLANDA to complete initial assessment and begin discharge planning. MOB verified and confirmed demographics. YOLANDA lives with family, at the address on file. YOLANDA reports she has good family support, and feels like she has the support she needs when she returns home. YOLANDA plans to breast feed baby and has pump to use at home. [facility] will provide follow up care for infant. YOLANDA has car seat, bassinet/crib, clothing, bottles and all necessary supplies for baby. YOLANDA has Medicaid, and will be adding baby to this policy. CM discussed process to add baby to insurance, YOLANDA verbalized understanding. 09/26/23 4400   Service Assessment   Patient Orientation Alert and Oriented   Cognition Alert   History Provided By Patient   Primary Caregiver Self   Support Systems Spouse/Significant Other   PCP Verified by CM Yes   Last Visit to PCP Within last 3 months   Prior Functional Level Independent in ADLs/IADLs   Current Functional Level Independent in ADLs/IADLs   Can patient return to prior living arrangement Yes   Ability to make needs known: Good   Family able to assist with home care needs: Yes   Would you like for me to discuss the discharge plan with any other family members/significant others, and if so, who?  No     FLORENTINO Barclay

## 2023-09-26 NOTE — PROGRESS NOTES
0445 Pt called out, RN to bedside, cook cath partially dislodged. RN removed cook cath without difficulty, pt tolerate well.

## 2023-09-27 LAB
GLUCOSE BLD STRIP.AUTO-MCNC: 85 MG/DL (ref 65–117)
SERVICE CMNT-IMP: NORMAL

## 2023-09-27 PROCEDURE — 82962 GLUCOSE BLOOD TEST: CPT

## 2023-09-27 PROCEDURE — 6370000000 HC RX 637 (ALT 250 FOR IP): Performed by: FAMILY MEDICINE

## 2023-09-27 PROCEDURE — 1120000000 HC RM PRIVATE OB

## 2023-09-27 PROCEDURE — 6370000000 HC RX 637 (ALT 250 FOR IP)

## 2023-09-27 PROCEDURE — 6370000000 HC RX 637 (ALT 250 FOR IP): Performed by: STUDENT IN AN ORGANIZED HEALTH CARE EDUCATION/TRAINING PROGRAM

## 2023-09-27 RX ADMIN — ACETAMINOPHEN 650 MG: 325 TABLET ORAL at 01:55

## 2023-09-27 RX ADMIN — ACETAMINOPHEN 650 MG: 325 TABLET ORAL at 07:36

## 2023-09-27 RX ADMIN — POLYETHYLENE GLYCOL 3350 17 G: 17 POWDER, FOR SOLUTION ORAL at 07:36

## 2023-09-27 RX ADMIN — IBUPROFEN 600 MG: 600 TABLET ORAL at 18:35

## 2023-09-27 RX ADMIN — ACETAMINOPHEN 650 MG: 325 TABLET ORAL at 18:35

## 2023-09-27 RX ADMIN — DOCUSATE SODIUM 100 MG: 100 CAPSULE, LIQUID FILLED ORAL at 07:36

## 2023-09-27 RX ADMIN — IBUPROFEN 600 MG: 600 TABLET ORAL at 07:36

## 2023-09-27 ASSESSMENT — PAIN SCALES - GENERAL
PAINLEVEL_OUTOF10: 3
PAINLEVEL_OUTOF10: 2
PAINLEVEL_OUTOF10: 3

## 2023-09-27 ASSESSMENT — PAIN DESCRIPTION - LOCATION
LOCATION: ABDOMEN
LOCATION: ABDOMEN
LOCATION: ABDOMEN;PERINEUM

## 2023-09-27 ASSESSMENT — PAIN DESCRIPTION - DESCRIPTORS: DESCRIPTORS: CRAMPING;SORE

## 2023-09-27 ASSESSMENT — PAIN DESCRIPTION - ORIENTATION: ORIENTATION: LOWER

## 2023-09-27 NOTE — CARE COORDINATION
9/27/2023  4:33 PM    CM assisted MOB in ordering breast pump. Signed order obtained and submitted via [employer] to  [employer]. Order approved. Obtained pump from DME close to provide to MOB.      Joni Kidd

## 2023-09-28 VITALS
OXYGEN SATURATION: 99 % | TEMPERATURE: 98.1 F | HEART RATE: 72 BPM | RESPIRATION RATE: 16 BRPM | SYSTOLIC BLOOD PRESSURE: 98 MMHG | DIASTOLIC BLOOD PRESSURE: 74 MMHG

## 2023-09-28 PROCEDURE — 6370000000 HC RX 637 (ALT 250 FOR IP)

## 2023-09-28 PROCEDURE — 6370000000 HC RX 637 (ALT 250 FOR IP): Performed by: FAMILY MEDICINE

## 2023-09-28 RX ORDER — POLYETHYLENE GLYCOL 3350 17 G/17G
17 POWDER, FOR SOLUTION ORAL DAILY
Qty: 30 PACKET | Refills: 0 | Status: SHIPPED | OUTPATIENT
Start: 2023-09-28 | End: 2023-10-28

## 2023-09-28 RX ORDER — IBUPROFEN 800 MG/1
800 TABLET ORAL EVERY 8 HOURS
Qty: 30 TABLET | Refills: 0 | Status: SHIPPED | OUTPATIENT
Start: 2023-09-28

## 2023-09-28 RX ADMIN — ACETAMINOPHEN 650 MG: 325 TABLET ORAL at 01:09

## 2023-09-28 RX ADMIN — IBUPROFEN 600 MG: 600 TABLET ORAL at 08:07

## 2023-09-28 RX ADMIN — DOCUSATE SODIUM 100 MG: 100 CAPSULE, LIQUID FILLED ORAL at 08:07

## 2023-09-28 ASSESSMENT — PAIN SCALES - GENERAL: PAINLEVEL_OUTOF10: 4

## 2023-09-28 ASSESSMENT — PAIN DESCRIPTION - DESCRIPTORS: DESCRIPTORS: SORE

## 2023-09-28 ASSESSMENT — PAIN DESCRIPTION - LOCATION: LOCATION: ABDOMEN

## 2023-09-28 ASSESSMENT — PAIN DESCRIPTION - ORIENTATION: ORIENTATION: LOWER

## 2023-09-28 NOTE — LACTATION NOTE
This note was copied from a baby's chart. Mother states she breast fed her baby and then gave formula last night. Baby was latched on and breastfeeding well during visit. Encouraged mother to offer baby her breast milk first for baby.  in room. Mother was ordered a breast pump by  which has arrived for her. LC reviewed the following:    Reviewed breastfeeding basics:  Supply and demand,  stomach size, early  Feeding cues, skin to skin, positioning and baby led latch-on, assymetrical latch with signs of good, deep latch vs shallow, feeding frequency and duration, and log sheet for tracking infant feedings and output. Breastfeeding Booklet and Warm line information given. Discussed typical  weight loss and the importance of infant weight checks with pediatrician 1-2 post discharge. Discussed eating a healthy diet. Instructed mother to eat a variety of foods in order to get a well balanced diet. She should consume an extra 500 calories per day (more than her non-pregnant requirement.) These extra calories will help provide energy needed for optimal breast milk production. Mother also encouraged to \"drink to thirst\" and it is recommended that she drink fluids such as water, fruit/vegetable juice. Nutritious snacks should be available so that she can eat throughout the day to help satisfy her hunger and maintain a good milk supply. Discussed what to do if she gets engorged or if her nipples become sore:    Engorgement Care Guidelines:  Reviewed how milk is made and normal phases of milk production. Taught care of engorged breasts - physiologic breastfeeding encouraged with use of cool packs (no ice directly on skin). Consider use of NSAIDS where appropriate for discomfort and inflammation. Can employ light touch, lymphatic drainage techniques on tender grandular tissues. Anticipatory guidance shared.       Care for sore/tender nipples discussed:  ways to
This note was copied from a baby's chart. Mother will successfully establish breastfeeding by feeding in response to early feeding cues   or wake every 3h, will obtain deep latch, and will keep log of feedings/output. Taught to BF at hunger cues and or q 2-3 hrs and to offer 10-20 drops of hand expressed colostrum at any non-feeds. Left Breast: Soft  Left Nipple: Protrude  Right Nipple: Protrude  Right Breast: Soft  Position and Latch: Independently, Good technique  Signs of Transfer: Uterine cramping, Audible infant swallows  Maternal Response: Relaxed and confident, Attentive, Comfortable,  Comfortable with position      Formula Type: Similac 360 Total Care     Latch: Grasps breast, tongue down, lips flanged, rhythmic sucking  Audible Swallowing: A few with stimulation  Type of Nipple: Everted (after stimulation)  Comfort (Breast/Nipple): Soft/non-tender  Hold (Positioning): No assist from staff, mother able to position/hold infant  LATCH Score: 9  Breast Care: Bra on, Lanolin provided, Nursing pads, Pumping supply provided, Using breast pump     Lactation Comment: Baby was latched on well to right breast with a wide open mouth and lips flanged out. Baby had a nice rhythmic suck with swallows heard.
pumping emphasized as infant(s) learns to nurse. Left Breast: Soft  Left Nipple: Protrude  Right Nipple: Protrude  Right Breast: Soft               Breast Care: Bra on, Lanolin provided, Nursing pads, Pumping supply provided, Using breast pump     Lactation Comment: Baby in nursery for observation. Mother to start pumping - symphony pump set up at bedside and instructions for use given. Hand outs given in [place].

## 2023-10-12 ENCOUNTER — OFFICE VISIT (OUTPATIENT)
Age: 28
End: 2023-10-12

## 2023-10-12 VITALS
RESPIRATION RATE: 18 BRPM | OXYGEN SATURATION: 98 % | DIASTOLIC BLOOD PRESSURE: 64 MMHG | TEMPERATURE: 98.4 F | WEIGHT: 140 LBS | HEIGHT: 59 IN | HEART RATE: 72 BPM | BODY MASS INDEX: 28.22 KG/M2 | SYSTOLIC BLOOD PRESSURE: 107 MMHG

## 2023-10-12 DIAGNOSIS — O24.410 GDM, CLASS A1: Primary | ICD-10-CM

## 2023-10-12 RX ORDER — IBUPROFEN 800 MG/1
800 TABLET ORAL EVERY 8 HOURS
Qty: 30 TABLET | Refills: 0 | Status: SHIPPED | OUTPATIENT
Start: 2023-10-12

## 2023-10-12 NOTE — PROGRESS NOTES
[address] [id number] Medicine Residency Attending Attestation: While the patient was in clinic or immediately following the patient leaving the clinic, I reviewed the patient's medical history, the resident's findings on physical examination, and the patient's diagnosis and treatment plan with the resident and agree with the documentation in the note.      Pari Oshea MD

## 2023-11-09 ENCOUNTER — NURSE ONLY (OUTPATIENT)
Age: 28
End: 2023-11-09

## 2023-11-09 DIAGNOSIS — O24.410 GDM, CLASS A1: Primary | ICD-10-CM

## 2023-11-11 LAB
GLUCOSE 1H P 75 G GLC PO SERPL-MCNC: ABNORMAL MG/DL
GLUCOSE 2H P 75 G GLC PO SERPL-MCNC: 82 MG/DL (ref 70–152)
GLUCOSE P FAST SERPL-MCNC: 103 MG/DL (ref 70–91)

## 2023-11-20 ENCOUNTER — OFFICE VISIT (OUTPATIENT)
Age: 28
End: 2023-11-20
Payer: MEDICAID

## 2023-11-20 VITALS
SYSTOLIC BLOOD PRESSURE: 106 MMHG | BODY MASS INDEX: 27.13 KG/M2 | WEIGHT: 136.38 LBS | OXYGEN SATURATION: 98 % | HEART RATE: 91 BPM | DIASTOLIC BLOOD PRESSURE: 78 MMHG | RESPIRATION RATE: 18 BRPM

## 2023-11-20 DIAGNOSIS — N94.89 SUPPRESSION OF MENSES: Primary | ICD-10-CM

## 2023-11-20 PROCEDURE — 99213 OFFICE O/P EST LOW 20 MIN: CPT

## 2023-11-20 RX ORDER — MEDROXYPROGESTERONE ACETATE 150 MG/ML
150 INJECTION, SUSPENSION INTRAMUSCULAR
Qty: 1 ML | Refills: 3
Start: 2023-11-20 | End: 2023-11-20

## 2023-11-20 RX ORDER — MEDROXYPROGESTERONE ACETATE 150 MG/ML
150 INJECTION, SUSPENSION INTRAMUSCULAR
Qty: 1 ML | Refills: 3 | Status: SHIPPED | OUTPATIENT
Start: 2023-11-20

## 2023-11-20 NOTE — PROGRESS NOTES
Interpretor Clark Vogel #[id number]  Patient used Depo before and lasted one month  Vitals:    11/20/23 1450   BP: 106/78   Pulse: 91   Resp: 18   SpO2: 98%      Chief Complaint   Patient presents with    Follow-up     Contraceptive consult